# Patient Record
Sex: FEMALE | Race: WHITE | NOT HISPANIC OR LATINO | Employment: FULL TIME | URBAN - METROPOLITAN AREA
[De-identification: names, ages, dates, MRNs, and addresses within clinical notes are randomized per-mention and may not be internally consistent; named-entity substitution may affect disease eponyms.]

---

## 2020-11-20 ENCOUNTER — OFFICE VISIT (OUTPATIENT)
Dept: OBSTETRICS AND GYNECOLOGY | Facility: CLINIC | Age: 45
End: 2020-11-20
Payer: COMMERCIAL

## 2020-11-20 VITALS
SYSTOLIC BLOOD PRESSURE: 130 MMHG | WEIGHT: 169 LBS | BODY MASS INDEX: 31.1 KG/M2 | HEIGHT: 62 IN | DIASTOLIC BLOOD PRESSURE: 77 MMHG

## 2020-11-20 DIAGNOSIS — Z00.00 WELLNESS EXAMINATION: Primary | ICD-10-CM

## 2020-11-20 DIAGNOSIS — Z12.31 BREAST CANCER SCREENING BY MAMMOGRAM: ICD-10-CM

## 2020-11-20 PROCEDURE — 3008F PR BODY MASS INDEX (BMI) DOCUMENTED: ICD-10-PCS | Mod: CPTII,S$GLB,, | Performed by: OBSTETRICS & GYNECOLOGY

## 2020-11-20 PROCEDURE — 99396 PREV VISIT EST AGE 40-64: CPT | Mod: S$GLB,,, | Performed by: OBSTETRICS & GYNECOLOGY

## 2020-11-20 PROCEDURE — 3008F BODY MASS INDEX DOCD: CPT | Mod: CPTII,S$GLB,, | Performed by: OBSTETRICS & GYNECOLOGY

## 2020-11-20 PROCEDURE — 99396 PR PREVENTIVE VISIT,EST,40-64: ICD-10-PCS | Mod: S$GLB,,, | Performed by: OBSTETRICS & GYNECOLOGY

## 2020-11-20 RX ORDER — LISINOPRIL 20 MG/1
20 TABLET ORAL DAILY
COMMUNITY
Start: 2020-09-29

## 2020-11-20 RX ORDER — PHENTERMINE AND TOPIRAMATE 7.5; 46 MG/1; MG/1
1 CAPSULE, EXTENDED RELEASE ORAL EVERY MORNING
Qty: 30 CAPSULE | Refills: 5 | Status: SHIPPED | OUTPATIENT
Start: 2020-11-20 | End: 2020-12-20

## 2020-11-20 RX ORDER — PHENTERMINE AND TOPIRAMATE 3.75; 23 MG/1; MG/1
1 CAPSULE, EXTENDED RELEASE ORAL DAILY
Qty: 14 CAPSULE | Refills: 0 | Status: SHIPPED | OUTPATIENT
Start: 2020-11-20 | End: 2020-11-30

## 2020-11-20 RX ORDER — PRAVASTATIN SODIUM 40 MG/1
40 TABLET ORAL DAILY
COMMUNITY
Start: 2020-10-07

## 2020-11-20 RX ORDER — METFORMIN HYDROCHLORIDE 500 MG/1
500 TABLET ORAL 2 TIMES DAILY
COMMUNITY
Start: 2020-08-19

## 2020-11-20 RX ORDER — LEVOTHYROXINE SODIUM 50 UG/1
25 TABLET ORAL
COMMUNITY
Start: 2020-10-06

## 2020-11-20 NOTE — PROGRESS NOTES
Subjective:       Patient ID: Alee Kirkpatrick is a 45 y.o. female.    Chief Complaint:  Well Woman      History of Present Illness  She is doing well.  No new health issues,  No abnormal bleeding, No GI or Gu concerns,  No dyspareunia.   No bleeding or spotting.   She is interested in trying a wt loss med   HPI      GYN & OB History  No LMP recorded. Patient has had a hysterectomy.     Date of Last Pap: No result found    OB History   No obstetric history on file.       Review of Systems  Review of Systems   Constitutional: Negative for activity change.   Eyes: Negative for visual disturbance.   Respiratory: Negative for shortness of breath.    Cardiovascular: Negative for chest pain.   Gastrointestinal: Negative for abdominal pain.   Genitourinary: Negative for vaginal bleeding.        No abnormal vaginal bleeding   Musculoskeletal: Negative for back pain.   Integumentary:  Negative for rash and breast mass.   Neurological: Negative for numbness.   Psychiatric/Behavioral:        No mood disturbance or changes    Breast: Negative for mass            Objective:    Physical Exam:   Constitutional: She is oriented to person, place, and time. She appears well-developed.    HENT:   Head: Normocephalic.     Neck: Trachea normal and normal range of motion. Neck supple. No thyromegaly present.    Cardiovascular: Normal rate, regular rhythm and normal heart sounds.     Pulmonary/Chest: Effort normal and breath sounds normal. Right breast exhibits no mass, no nipple discharge and no skin change. Left breast exhibits no mass, no nipple discharge and no skin change.   Mild fibrocystic changes    During the exam self breast exam discussed         Abdominal: Soft. Normal appearance. There is no abdominal tenderness. There is no rigidity and no guarding.     Genitourinary:    Vagina normal.      Pelvic exam was performed with patient supine.   There is no tenderness or lesion on the right labia. There is no tenderness or lesion  on the left labia. Uterus is absent. Right adnexum displays no mass and no tenderness. Left adnexum displays no mass and no tenderness. Vaginal cuff normal.Labial bartholins normal.Cervix exhibits absence.              Lymphadenopathy:        Head (right side): No submental and no submandibular adenopathy present.        Head (left side): No submental and no submandibular adenopathy present.     She has no cervical adenopathy.    Neurological: She is alert and oriented to person, place, and time.    Skin: Skin is warm.    Psychiatric: She has a normal mood and affect. Her speech is normal and behavior is normal. Thought content normal.          Assessment:        1. Wellness examination    2. Breast cancer screening by mammogram               Plan:             Pap   Mammogram  Follow up one year or sooner if needed  Self breast exams  Consider health profile if labs not done with PCP  Pt is aware we call all results. If she does not hear from our office regarding her result within a week of having a study or procedure performed she is to call the office so that we can research the result for her as I do not know when she is scheduling her procedures.   Chaperone was present

## 2021-01-29 ENCOUNTER — TELEPHONE (OUTPATIENT)
Dept: OBSTETRICS AND GYNECOLOGY | Facility: CLINIC | Age: 46
End: 2021-01-29

## 2021-12-13 ENCOUNTER — OFFICE VISIT (OUTPATIENT)
Dept: OBSTETRICS AND GYNECOLOGY | Facility: CLINIC | Age: 46
End: 2021-12-13
Payer: COMMERCIAL

## 2021-12-13 VITALS
BODY MASS INDEX: 33.29 KG/M2 | DIASTOLIC BLOOD PRESSURE: 85 MMHG | WEIGHT: 182 LBS | SYSTOLIC BLOOD PRESSURE: 132 MMHG | HEART RATE: 83 BPM

## 2021-12-13 DIAGNOSIS — Z12.31 BREAST CANCER SCREENING BY MAMMOGRAM: ICD-10-CM

## 2021-12-13 DIAGNOSIS — Z01.419 WELL WOMAN EXAM WITH ROUTINE GYNECOLOGICAL EXAM: Primary | ICD-10-CM

## 2021-12-13 PROCEDURE — 4010F ACE/ARB THERAPY RXD/TAKEN: CPT | Mod: CPTII,S$GLB,, | Performed by: OBSTETRICS & GYNECOLOGY

## 2021-12-13 PROCEDURE — 99396 PR PREVENTIVE VISIT,EST,40-64: ICD-10-PCS | Mod: S$GLB,,, | Performed by: OBSTETRICS & GYNECOLOGY

## 2021-12-13 PROCEDURE — 4010F PR ACE/ARB THEARPY RXD/TAKEN: ICD-10-PCS | Mod: CPTII,S$GLB,, | Performed by: OBSTETRICS & GYNECOLOGY

## 2021-12-13 PROCEDURE — 99396 PREV VISIT EST AGE 40-64: CPT | Mod: S$GLB,,, | Performed by: OBSTETRICS & GYNECOLOGY

## 2021-12-13 RX ORDER — AZELAIC ACID 0.15 G/G
AEROSOL, FOAM TOPICAL
COMMUNITY
Start: 2021-09-30

## 2022-02-25 ENCOUNTER — TELEPHONE (OUTPATIENT)
Dept: GASTROENTEROLOGY | Facility: CLINIC | Age: 47
End: 2022-02-25
Payer: COMMERCIAL

## 2022-02-25 NOTE — TELEPHONE ENCOUNTER
----- Message from Zulema Rahman sent at 2/25/2022 10:00 AM CST -----  Alee Kirkpatrick would like for Johanne to give her a call back to schedule a colonoscopy with Dr Villatoro sometime in May or June. Please give her a call back at [phone]

## 2022-02-28 DIAGNOSIS — Z12.11 SCREENING FOR MALIGNANT NEOPLASM OF COLON: ICD-10-CM

## 2022-02-28 DIAGNOSIS — Z86.010 HISTORY OF COLON POLYPS: Primary | ICD-10-CM

## 2022-02-28 NOTE — TELEPHONE ENCOUNTER
----- Message from Jennifer Vela sent at 2/28/2022  3:40 PM CST -----  Pt is returning call to schedule colonoscopy. Call back is [phone]

## 2022-03-01 VITALS — WEIGHT: 182 LBS | HEIGHT: 62 IN | BODY MASS INDEX: 33.49 KG/M2

## 2022-03-01 RX ORDER — SOD SULF/POT CHLORIDE/MAG SULF 1.479 G
12 TABLET ORAL DAILY
Qty: 24 TABLET | Refills: 0 | Status: SHIPPED | OUTPATIENT
Start: 2022-03-01

## 2022-05-05 ENCOUNTER — TELEPHONE (OUTPATIENT)
Dept: GASTROENTEROLOGY | Facility: CLINIC | Age: 47
End: 2022-05-05
Payer: COMMERCIAL

## 2022-05-05 NOTE — TELEPHONE ENCOUNTER
----- Message from Vicky Collado sent at 5/5/2022  1:49 PM CDT -----  Contact: self  Patient called and asked if she can take any heart burn medication before her procedure? Please call   [phone]

## 2022-05-05 NOTE — TELEPHONE ENCOUNTER
Spoke with patient she understands to not take her heartburn medication the morning of her procedure-GRACE

## 2022-05-06 ENCOUNTER — OUTSIDE PLACE OF SERVICE (OUTPATIENT)
Dept: GASTROENTEROLOGY | Facility: CLINIC | Age: 47
End: 2022-05-06
Payer: COMMERCIAL

## 2022-05-06 PROCEDURE — 45385 PR COLONOSCOPY,REMV LESN,SNARE: ICD-10-PCS | Mod: 33,,, | Performed by: INTERNAL MEDICINE

## 2022-05-06 PROCEDURE — 45385 COLONOSCOPY W/LESION REMOVAL: CPT | Mod: 33,,, | Performed by: INTERNAL MEDICINE

## 2022-05-10 ENCOUNTER — TELEPHONE (OUTPATIENT)
Dept: GASTROENTEROLOGY | Facility: CLINIC | Age: 47
End: 2022-05-10
Payer: COMMERCIAL

## 2022-05-10 NOTE — TELEPHONE ENCOUNTER
----- Message from Zulema Rahman sent at 5/10/2022 12:59 PM CDT -----  Alee Kirkpatrick stated that she called yesterday to speak with the nurse but she haven't heard back from anyone yet. Please give her a call back at [phone]

## 2022-05-11 ENCOUNTER — TELEPHONE (OUTPATIENT)
Dept: GASTROENTEROLOGY | Facility: CLINIC | Age: 47
End: 2022-05-11
Payer: COMMERCIAL

## 2022-05-11 DIAGNOSIS — K64.9 HEMORRHOIDS, UNSPECIFIED HEMORRHOID TYPE: Primary | ICD-10-CM

## 2022-05-11 RX ORDER — HYDROCORTISONE 25 MG/G
CREAM TOPICAL 2 TIMES DAILY
Qty: 30 G | Refills: 0 | Status: SHIPPED | OUTPATIENT
Start: 2022-05-11 | End: 2022-05-21

## 2022-05-11 NOTE — TELEPHONE ENCOUNTER
Patient had a colonoscopy last Friday, 5/6/22 and has has had a hemorrhoidal flare-up since then. She has a past history of hemorrhoids. She is requesting something be sent to her pharmacy to help with that. Confirmed no known allergies and pharmacy on record is correct.   ----- Message from Zulema Rahman sent at 5/11/2022 10:31 AM CDT -----  Alee Kirkpatrick is calling to speak with the nurse in regards to her upcoming procedure on Friday and complications she's having. Please give her a call back at [phone] (home)

## 2022-05-12 ENCOUNTER — TELEPHONE (OUTPATIENT)
Dept: GASTROENTEROLOGY | Facility: CLINIC | Age: 47
End: 2022-05-12

## 2022-05-12 NOTE — TELEPHONE ENCOUNTER
1 TA, 1 hyp, repeat colon in 5y.   CMA to notify patient. Did she get the hemorrhoid therapy send to her pharmacy?  NBP

## 2022-05-12 NOTE — TELEPHONE ENCOUNTER
I left a voicemail with the results and asking the patient to contact us with any issues.  TIFFANIEL, TERI

## 2024-07-26 NOTE — TELEPHONE ENCOUNTER
Return to the Emergency Department for any worsening, change in condition, or any emergent concerns.     See [facility] chart. The patient had a polyp on her 2018 colonoscopy (27mm inflammatory polyp in the rectum) and was recommended to have a repeat colononscopy in 3 years. Update order Dx: h/o colonic polyp then resend to Johanne so updated order sent for procedure.  NBP

## 2024-11-21 ENCOUNTER — OFFICE VISIT (OUTPATIENT)
Dept: OBSTETRICS AND GYNECOLOGY | Facility: CLINIC | Age: 49
End: 2024-11-21
Payer: COMMERCIAL

## 2024-11-21 VITALS
SYSTOLIC BLOOD PRESSURE: 111 MMHG | DIASTOLIC BLOOD PRESSURE: 74 MMHG | HEART RATE: 83 BPM | BODY MASS INDEX: 29.89 KG/M2 | WEIGHT: 163.38 LBS

## 2024-11-21 DIAGNOSIS — Z12.31 SCREENING MAMMOGRAM FOR BREAST CANCER: ICD-10-CM

## 2024-11-21 DIAGNOSIS — Z01.419 WELL WOMAN EXAM WITH ROUTINE GYNECOLOGICAL EXAM: Primary | ICD-10-CM

## 2024-11-21 DIAGNOSIS — R23.2 HOT FLASHES: ICD-10-CM

## 2024-11-21 DIAGNOSIS — R68.82 DECREASED LIBIDO: ICD-10-CM

## 2024-11-21 LAB
E2: 177 PG/ML
FREE TESTOSTERONE: 0.25 NG/DL (ref 0–1)
FSH: 2.8 MIU/ML
LH: 4.89 MIU/ML
TESTOST SERPL-MCNC: 19.7 NG/DL (ref 8.4–48.1)

## 2024-11-21 NOTE — PROGRESS NOTES
Subjective:      Patient ID: Alee Kirkpatrick is a 49 y.o. female who presents for evaluation today.    Chief Complaint:    Well Woman (Pt has no c/o )      History of Present Illness  HPI  Annual Exam (Premenopausal) - Patient presents for annual exam. The patient also has complaints of brain fog, decreased libido, hot flashes. The patient is sexually active. GYN screening history: last pap: was normal and last mammogram: was normal. The patient wears seatbelts: yes. The patient participates in regular exercise: yes. Has the patient ever been transfused or tattooed?: not asked. The patient reports that there is not domestic violence in her life. She reports intermittent hot flashes, brain fog and decreased libido. She had a partial hysterectomy several years ago. She had a benign breast biopsy this past summer. She denies GI or  problems. She sees Dr. Harvey for her primary care, thyroid labs utd.    GYN History  No LMP recorded. Patient has had a hysterectomy.   Date of Last Pap: Pap smear completed today with HPV co-testing    VITALS  /74   Pulse 83   Wt 74.1 kg (163 lb 6.4 oz)   BMI 29.89 kg/m²   Weight: 74.1 kg (163 lb 6.4 oz)         PAST MEDICAL HISTORY  Past Medical History:   Diagnosis Date    Anemia     Chicken pox     Diabetes mellitus     Excessive menstruation     Goiter     Hemorrhoid     Hyperlipidemia     Hypertension     hypothyroidsim     Macrosomia        PAST SURGICAL HISTORY  Past Surgical History:   Procedure Laterality Date     SECTION, CLASSIC      HYSTERECTOMY         SOCIAL HISTORY  Social History     Tobacco Use   Smoking Status Never   Smokeless Tobacco Never   ,   Social History     Substance and Sexual Activity   Alcohol Use Not Currently    Comment: social        MEDICATIONS  Outpatient Medications Marked as Taking for the 24 encounter (Office Visit) with Andria Fish NP   Medication Sig Dispense Refill    levothyroxine (SYNTHROID) 50 MCG tablet 25  mcg.      lisinopriL (PRINIVIL,ZESTRIL) 20 MG tablet Take 20 mg by mouth once daily.      pravastatin (PRAVACHOL) 40 MG tablet Take 40 mg by mouth once daily.      semaglutide (OZEMPIC) 0.25 mg or 0.5 mg (2 mg/3 mL) pen injector Inject 0.5 mg into the skin every 7 days.           Review of Systems   Review of Systems   Constitutional:  Negative for activity change.   Eyes:  Negative for visual disturbance.   Respiratory:  Negative for shortness of breath.    Cardiovascular:  Negative for chest pain.   Gastrointestinal:  Negative for abdominal pain.   Genitourinary:  Positive for decreased libido and hot flashes. Negative for vaginal bleeding.        No abnormal vaginal bleeding   Musculoskeletal:  Negative for back pain.   Integumentary:  Negative for rash and breast mass.   Neurological:  Negative for numbness.   Psychiatric/Behavioral:          No mood disturbance or changes    Breast: Negative for mass          Objective:     Physical Exam:   Constitutional: She is oriented to person, place, and time. She appears well-developed.    HENT:   Head: Normocephalic.     Neck: Trachea normal. No thyromegaly present.    Cardiovascular:  Normal rate, regular rhythm and normal heart sounds.             Pulmonary/Chest: Effort normal and breath sounds normal. Right breast exhibits no mass, no nipple discharge and no skin change. Left breast exhibits no mass, no nipple discharge and no skin change.   Mild fibrocystic changes    During the exam self breast exam discussed         Abdominal: Soft. There is no abdominal tenderness. There is no rigidity and no guarding.     Genitourinary:    Vagina normal.      Pelvic exam was performed with patient supine.   The external female genitalia was normal.     Labial bartholins normal.There is no lesion on the right labia. There is no lesion on the left labia. Right adnexum displays no mass and no tenderness. Left adnexum displays no mass and no tenderness. Cervix is absent.Uterus is  absent.              Lymphadenopathy:        Head (right side): No submental and no submandibular adenopathy present.        Head (left side): No submental and no submandibular adenopathy present.     She has no cervical adenopathy.    Neurological: She is alert and oriented to person, place, and time.    Skin: Skin is warm.    Psychiatric: She has a normal mood and affect. Her speech is normal and behavior is normal. Thought content normal.          Assessment:     Well woman exam with routine gynecological exam  -     Liquid-based pap smear, screening    Hot flashes  -     Estradiol; Future; Expected date: 11/21/2024  -     Follicle Stimulating Hormone; Future; Expected date: 11/21/2024  -     Luteinizing Hormone; Future; Expected date: 11/21/2024    Decreased libido  -     Testosterone; Future; Expected date: 11/21/2024  -     Testosterone, Free; Future; Expected date: 11/21/2024    Screening mammogram for breast cancer  -     Mammo Digital Screening Bilat; Future; Expected date: 11/21/2024         Plan:     Patient instructed to contact the clinic should any questions or concerns arise prior to her next office visit. Patient is happy with the plan of care at this time, verbalizes understanding and denies outstanding questions.      Pap  Mammogram  Self-breast exams  Consider annual health panel with Primary Care if not done  Colonoscopy as indicated  Bone density discussed  If you don't hear from the office regarding results within 1 week, please call  Follow up in 1 year for annual or sooner as needed  Chaperone present for exam   Female chaperone present.

## 2024-11-22 LAB — Lab: NORMAL
